# Patient Record
Sex: FEMALE
[De-identification: names, ages, dates, MRNs, and addresses within clinical notes are randomized per-mention and may not be internally consistent; named-entity substitution may affect disease eponyms.]

---

## 2024-02-06 PROBLEM — Z00.00 ENCOUNTER FOR PREVENTIVE HEALTH EXAMINATION: Status: ACTIVE | Noted: 2024-02-06

## 2024-02-07 ENCOUNTER — APPOINTMENT (OUTPATIENT)
Dept: OTOLARYNGOLOGY | Facility: CLINIC | Age: 69
End: 2024-02-07
Payer: MEDICARE

## 2024-02-07 VITALS — TEMPERATURE: 97 F | BODY MASS INDEX: 21.9 KG/M2 | WEIGHT: 119 LBS | HEIGHT: 62 IN

## 2024-02-07 DIAGNOSIS — Z78.9 OTHER SPECIFIED HEALTH STATUS: ICD-10-CM

## 2024-02-07 DIAGNOSIS — J02.9 ACUTE PHARYNGITIS, UNSPECIFIED: ICD-10-CM

## 2024-02-07 DIAGNOSIS — H60.8X1 OTHER OTITIS EXTERNA, RIGHT EAR: ICD-10-CM

## 2024-02-07 DIAGNOSIS — H65.03 ACUTE SEROUS OTITIS MEDIA, BILATERAL: ICD-10-CM

## 2024-02-07 DIAGNOSIS — Z21 ASYMPTOMATIC HUMAN IMMUNODEFICIENCY VIRUS [HIV] INFECTION STATUS: ICD-10-CM

## 2024-02-07 PROCEDURE — 99204 OFFICE O/P NEW MOD 45 MIN: CPT | Mod: 25

## 2024-02-07 PROCEDURE — 92553 AUDIOMETRY AIR & BONE: CPT

## 2024-02-07 PROCEDURE — 92567 TYMPANOMETRY: CPT

## 2024-02-07 PROCEDURE — 92504 EAR MICROSCOPY EXAMINATION: CPT

## 2024-02-07 RX ORDER — OFLOXACIN OTIC 3 MG/ML
0.3 SOLUTION AURICULAR (OTIC)
Qty: 5 | Refills: 1 | Status: ACTIVE | COMMUNITY
Start: 2024-02-07 | End: 1900-01-01

## 2024-02-07 RX ORDER — SULFAMETHOXAZOLE AND TRIMETHOPRIM 400; 80 MG/1; MG/1
400-80 TABLET ORAL TWICE DAILY
Qty: 20 | Refills: 0 | Status: ACTIVE | COMMUNITY
Start: 2024-02-07 | End: 1900-01-01

## 2024-02-07 RX ORDER — DOLUTEGRAVIR SODIUM 50 MG/1
TABLET, FILM COATED ORAL
Refills: 0 | Status: ACTIVE | COMMUNITY

## 2024-02-07 RX ORDER — METHYLPREDNISOLONE 4 MG/1
4 TABLET ORAL
Qty: 1 | Refills: 0 | Status: ACTIVE | COMMUNITY
Start: 2024-02-07 | End: 1900-01-01

## 2024-02-07 NOTE — REASON FOR VISIT
[Initial Consultation] : an initial consultation for [Spouse] : spouse [FreeTextEntry2] : hearing decreased/ possible ear infection

## 2024-02-07 NOTE — PHYSICAL EXAM
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

## 2024-02-07 NOTE — HISTORY OF PRESENT ILLNESS
[de-identified] : Had cough and throat problem about 2 weeks ago - seen at [facility]gent [facility].  told of left ear infection - treated with abx - ? abx.  Didn't clear - then had draianage from right ear.  Also ? drainage from lert.  Occ ear infection as child.  ? fever.  Did have negative covid and flu tests.  Was on cefdinir

## 2024-02-07 NOTE — DATA REVIEWED
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

## 2024-02-07 NOTE — PROCEDURE
[Risk and Benefits Discussed] : The purpose, risks, discomforts, benefits and alternatives of the procedure have been explained to the patient including no treatment. [Same] : same as the Pre Op Dx. [] : Binocular Microscopy [FreeTextEntry1] : 2 week hx of URI and ear discomfort  [FreeTextEntry6] : retracted right tm wtih atrophic posterior superior tm and KONSTANTIN

## 2024-02-07 NOTE — REVIEW OF SYSTEMS
[Ear Itch] : ear itch [Hearing Loss] : hearing loss [Ear Drainage] : ear drainage [Negative] : Heme/Lymph [FreeTextEntry1] : pt had ear infection about 2 weeks ago  [de-identified] : slight throat discomfort, cough for about 2 wks worse at night

## 2024-02-07 NOTE — ASSESSMENT
[FreeTextEntry1] : Patient s/p URI with problems with hearing and ear discomfort.  Does have pharygitis and left KONSTANTIN and mixed loss left ear.  Throat culture done. also has escar or possible retraction of right tm -  recommended ofloxacin drops for right ear and also started on bactrimi and medrol for left ear.   Follow up  approx 3 weeks.

## 2024-02-12 ENCOUNTER — NON-APPOINTMENT (OUTPATIENT)
Age: 69
End: 2024-02-12

## 2024-02-12 LAB — BACTERIA THROAT CULT: NORMAL

## 2024-03-04 RX ORDER — METHYLPREDNISOLONE 4 MG/1
4 TABLET ORAL
Qty: 1 | Refills: 0 | Status: ACTIVE | COMMUNITY
Start: 2024-03-04 | End: 1900-01-01

## 2024-03-12 ENCOUNTER — APPOINTMENT (OUTPATIENT)
Dept: OTOLARYNGOLOGY | Facility: CLINIC | Age: 69
End: 2024-03-12
Payer: MEDICARE

## 2024-03-12 VITALS — WEIGHT: 121 LBS | HEIGHT: 62 IN | BODY MASS INDEX: 22.26 KG/M2

## 2024-03-12 DIAGNOSIS — J32.0 CHRONIC MAXILLARY SINUSITIS: ICD-10-CM

## 2024-03-12 DIAGNOSIS — H73.891 OTHER SPECIFIED DISORDERS OF TYMPANIC MEMBRANE, RIGHT EAR: ICD-10-CM

## 2024-03-12 DIAGNOSIS — H90.5 UNSPECIFIED SENSORINEURAL HEARING LOSS: ICD-10-CM

## 2024-03-12 PROCEDURE — 99214 OFFICE O/P EST MOD 30 MIN: CPT | Mod: 25

## 2024-03-12 PROCEDURE — 92567 TYMPANOMETRY: CPT

## 2024-03-12 PROCEDURE — 92557 COMPREHENSIVE HEARING TEST: CPT

## 2024-03-12 PROCEDURE — 31231 NASAL ENDOSCOPY DX: CPT

## 2024-03-12 NOTE — PHYSICAL EXAM
[Midline] : trachea located in midline position [Normal] : no rashes [de-identified] : sl retracted tm bilat - patient did respond to politzer  [Nasal Endoscopy Performed] : nasal endoscopy was performed, see procedure section for findings [de-identified] : 1+ -

## 2024-03-12 NOTE — ASSESSMENT
[FreeTextEntry1] : Patient here for followup of left mj.  Ear clear now and patient feelling better.  Hearing improved.  Still has rounded tymp left ear - ear did respond to inflation with politzer.   Minimal conductive loss at this time.   Recommended auto inflation of ET 2-3x /day.  Follow up in several mos.  Also discussed use of afrin prior to airplane flights and ear plane ear plugs.   Followup 3 mos.  Right tm appears normal today - No evidence of sinusitis and NP normal

## 2024-03-12 NOTE — HISTORY OF PRESENT ILLNESS
[de-identified] : Here for followup of left mixed loss.  Does feel improved.   Was on medrol and bactrim.

## 2024-03-27 RX ORDER — FLUTICASONE PROPIONATE 50 UG/1
50 SPRAY, METERED NASAL DAILY
Qty: 1 | Refills: 3 | Status: ACTIVE | COMMUNITY
Start: 2024-03-27 | End: 1900-01-01

## 2024-04-18 ENCOUNTER — APPOINTMENT (OUTPATIENT)
Dept: OTOLARYNGOLOGY | Facility: CLINIC | Age: 69
End: 2024-04-18
Payer: MEDICARE

## 2024-04-18 VITALS — BODY MASS INDEX: 22.26 KG/M2 | WEIGHT: 121 LBS | HEIGHT: 62 IN

## 2024-04-18 DIAGNOSIS — H90.A32 MIXED CONDUCTIVE AND SENSORINEURAL HEARING, UNILATERAL, LEFT EAR WITH RESTRICTED HEARING ON THE  CONTRALATERAL SIDE: ICD-10-CM

## 2024-04-18 DIAGNOSIS — H69.93 UNSPECIFIED EUSTACHIAN TUBE DISORDER, BILATERAL: ICD-10-CM

## 2024-04-18 DIAGNOSIS — H73.892 OTHER SPECIFIED DISORDERS OF TYMPANIC MEMBRANE, LEFT EAR: ICD-10-CM

## 2024-04-18 DIAGNOSIS — H60.312 DIFFUSE OTITIS EXTERNA, LEFT EAR: ICD-10-CM

## 2024-04-18 PROCEDURE — 92557 COMPREHENSIVE HEARING TEST: CPT

## 2024-04-18 PROCEDURE — 92567 TYMPANOMETRY: CPT

## 2024-04-18 PROCEDURE — 99214 OFFICE O/P EST MOD 30 MIN: CPT

## 2024-04-18 RX ORDER — OFLOXACIN OTIC 3 MG/ML
0.3 SOLUTION AURICULAR (OTIC) TWICE DAILY
Qty: 1 | Refills: 1 | Status: ACTIVE | COMMUNITY
Start: 2024-04-18 | End: 1900-01-01

## 2024-04-18 NOTE — DATA REVIEWED
[de-identified] : Type A tymp AD. Type As tymp AS. AD- WNL with moderate likely SNHL components at 6 & 8kHz. AS- WNL up to 3kHz, sloping to a mild to moderate SNHL.

## 2024-04-18 NOTE — HISTORY OF PRESENT ILLNESS
[de-identified] : Left ear still feels clear - occ noise in ear.  Recently flew with afrin and ear planes - did ok.

## 2024-04-18 NOTE — ASSESSMENT
[FreeTextEntry1] : Patient with clogged left ear -  ear exam neg except for some debris stuck to left tm -  audio normal with hfsnhl and As tyjmp on left.  REcommended ofloacin drops and follow up 2 weeks to recheck left tm.   Discussed PET but since no conductdive component to hearing issue would hold off.

## 2024-04-18 NOTE — PHYSICAL EXAM
[Nasal Endoscopy Performed] : nasal endoscopy was performed, see procedure section for findings [Midline] : trachea located in midline position [Normal] : no rashes [de-identified] : dry cerumen on left tm - mild oe  [de-identified] : retracted  left tm - no response today to politzer.   512 tf  midline.   [de-identified] : 1+ -

## 2024-05-02 ENCOUNTER — APPOINTMENT (OUTPATIENT)
Dept: OTOLARYNGOLOGY | Facility: CLINIC | Age: 69
End: 2024-05-02

## 2024-08-20 ENCOUNTER — APPOINTMENT (OUTPATIENT)
Dept: OTOLARYNGOLOGY | Facility: CLINIC | Age: 69
End: 2024-08-20
Payer: MEDICARE

## 2024-08-20 VITALS — WEIGHT: 120 LBS | BODY MASS INDEX: 22.08 KG/M2 | HEIGHT: 62 IN

## 2024-08-20 DIAGNOSIS — H90.5 UNSPECIFIED SENSORINEURAL HEARING LOSS: ICD-10-CM

## 2024-08-20 DIAGNOSIS — H93.12 TINNITUS, LEFT EAR: ICD-10-CM

## 2024-08-20 DIAGNOSIS — H69.93 UNSPECIFIED EUSTACHIAN TUBE DISORDER, BILATERAL: ICD-10-CM

## 2024-08-20 PROCEDURE — 99214 OFFICE O/P EST MOD 30 MIN: CPT

## 2024-08-20 PROCEDURE — 92567 TYMPANOMETRY: CPT

## 2024-08-20 PROCEDURE — 92553 AUDIOMETRY AIR & BONE: CPT

## 2024-08-20 RX ORDER — FLUTICASONE PROPIONATE 50 UG/1
50 SPRAY, METERED NASAL
Qty: 1 | Refills: 1 | Status: ACTIVE | COMMUNITY
Start: 2024-08-20 | End: 1900-01-01

## 2024-08-20 RX ORDER — AZELASTINE HYDROCHLORIDE 137 UG/1
0.1 SPRAY, METERED NASAL TWICE DAILY
Qty: 1 | Refills: 4 | Status: ACTIVE | COMMUNITY
Start: 2024-08-20 | End: 1900-01-01

## 2024-08-20 NOTE — HISTORY OF PRESENT ILLNESS
[de-identified] : c/o occ noise in left ear - occ hears whoosh in ear and occ echo in left ear.   Ear feels clogged Here for recheck. Problem not pulsatile

## 2024-08-20 NOTE — REVIEW OF SYSTEMS
[Ear Noises] : ear noises [Negative] : Heme/Lymph [de-identified] : left ear issues/ eco sound from lefty ear

## 2024-08-20 NOTE — DATA REVIEWED
[de-identified] :  Type Ad tymp AD. Type A tymp AS. Re-eval via inserts: AD- WNL up to 4kHz, sloping to a mod HFHL. AS- WNL up to 3kHz, sloping to a mild to mod SNHL. Recs: 1) F/u w/ MD 2) ABR and re-eval as per MD

## 2024-08-20 NOTE — PHYSICAL EXAM
[Nasal Endoscopy Performed] : nasal endoscopy was performed, see procedure section for findings [Midline] : trachea located in midline position [Normal] : no rashes [de-identified] : dry cerumen on left tm - mild oe  [de-identified] : retracted  left tm - no response today to politzer.   512 tf  midline.   [de-identified] : 1+ -

## 2024-08-20 NOTE — ASSESSMENT
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